# Patient Record
Sex: FEMALE | Race: WHITE | NOT HISPANIC OR LATINO | URBAN - METROPOLITAN AREA
[De-identification: names, ages, dates, MRNs, and addresses within clinical notes are randomized per-mention and may not be internally consistent; named-entity substitution may affect disease eponyms.]

---

## 2018-12-17 PROBLEM — R19.7 DIARRHEA: Status: ACTIVE | Noted: 2018-12-17

## 2018-12-17 PROBLEM — R53.83 FATIGUE: Status: ACTIVE | Noted: 2018-12-17

## 2018-12-17 PROBLEM — D64.9 ANEMIA: Status: ACTIVE | Noted: 2018-12-17

## 2019-11-25 ENCOUNTER — OFFICE VISIT (OUTPATIENT)
Dept: URGENT CARE | Facility: CLINIC | Age: 34
End: 2019-11-25
Payer: COMMERCIAL

## 2019-11-25 VITALS
WEIGHT: 184 LBS | SYSTOLIC BLOOD PRESSURE: 118 MMHG | BODY MASS INDEX: 30.15 KG/M2 | DIASTOLIC BLOOD PRESSURE: 62 MMHG | RESPIRATION RATE: 14 BRPM

## 2019-11-25 DIAGNOSIS — J00 NASOPHARYNGITIS: Primary | ICD-10-CM

## 2019-11-25 PROCEDURE — 99213 OFFICE O/P EST LOW 20 MIN: CPT | Performed by: PHYSICIAN ASSISTANT

## 2019-11-25 NOTE — PROGRESS NOTES
[facility]        NAME: Radha Quispe is a 30 y o  female  : 1985    MRN: [id number]  DATE: 2019  TIME: 1:59 PM    Assessment and Plan   Nasopharyngitis [J00]  1  Nasopharyngitis       Patient Instructions   Continue Advil cold and Sinus  Continue Allegra and Flonase as previously instructed  Saline nasal spray or Neti pot will help to flush her nasal passages  Saltwater gargles, warm tea with honey, and throat lozenges may be relieving of throat discomfort  Use a cool mist humidifier at bedtime, turning on hours prior to bed with your bedroom doors shut for maximum relief  Follow up with your family doctor in 3-5 days  Proceed to the ER if symptoms worsen  Chief Complaint     Chief Complaint   Patient presents with    Cold Like Symptoms     PND, sore throat, stuffy nose; started yesterday     History of Present Illness     29 y/o female with c/o sore throat x 1 day  Reports sudden onset of symptoms late yesterday with evening  Notes NC, mucus in her throat, and a constant soreness  Had body aches yesterday, now resolved  Notes she is feeling better but wanted to be checked just in case  Treated yesterday with Advil sinus and congestion with some relief yesterday  No sick contacts or recent travel  Nonsmoker  No flu shot  Reports hx of seasonal allergies during spring, and notes she does get sore throat with this  Not currently using her Allegra or Flonase  No difficulty swallowing  Review of Systems   Review of Systems   Constitutional: Negative for chills, diaphoresis, fatigue and fever  HENT: Positive for congestion, postnasal drip and sore throat  Negative for ear pain and rhinorrhea  Respiratory: Negative for cough and wheezing  Gastrointestinal: Negative for abdominal pain, diarrhea, nausea and vomiting  Musculoskeletal: Positive for myalgias  Skin: Negative for rash  Neurological: Negative for dizziness and headaches       Current Medications     No current outpatient medications on file  Current Allergies     Allergies as of 2019    (No Known Allergies)            The following portions of the patient's history were reviewed and updated as appropriate: allergies, current medications, past family history, past medical history, past social history, past surgical history and problem list      Past Medical History:   Diagnosis Date    Patient denies medical problems        Past Surgical History:   Procedure Laterality Date     SECTION       SECTION         Family History   Problem Relation Age of Onset    No Known Problems Mother     Hypertension Father          Medications have been verified  Objective   /62   Resp 14   Wt 83 5 kg (184 lb)   LMP 2019   BMI 30 15 kg/m²     Physical Exam     Physical Exam   Constitutional: Vital signs are normal  She appears well-developed and well-nourished  She is cooperative  She does not appear ill  No distress  HENT:   Head: Normocephalic and atraumatic  Right Ear: Hearing, tympanic membrane, external ear and ear canal normal    Left Ear: Hearing, tympanic membrane, external ear and ear canal normal    Nose: Mucosal edema and rhinorrhea present  Mouth/Throat: Uvula is midline and mucous membranes are normal  Mucous membranes are not pale, not dry and not cyanotic  No oral lesions  No uvula swelling  Posterior oropharyngeal erythema (mild) present  No oropharyngeal exudate, posterior oropharyngeal edema or tonsillar abscesses  Tonsils are 1+ on the right  Tonsils are 1+ on the left  No tonsillar exudate  Eyes: Conjunctivae and lids are normal  Right eye exhibits no discharge and no exudate  Left eye exhibits no discharge and no exudate  Neck: Trachea normal and phonation normal  Neck supple  No tracheal tenderness present  No neck rigidity  No edema and no erythema present  Cardiovascular: Normal rate, regular rhythm and normal heart sounds   Exam reveals no distant heart sounds  Pulmonary/Chest: Effort normal and breath sounds normal  No stridor  No respiratory distress  She has no decreased breath sounds  She has no wheezes  She has no rhonchi  She has no rales  Abdominal: Soft  Bowel sounds are normal  She exhibits no distension and no mass  There is no tenderness  There is no rigidity, no rebound and no guarding  Lymphadenopathy:     She has no cervical adenopathy  Neurological: She is alert  She is not disoriented  No cranial nerve deficit  Coordination and gait normal    Skin: Skin is warm, dry and intact  No rash noted  She is not diaphoretic  No erythema  No pallor  Psychiatric: She has a normal mood and affect  Her behavior is normal  Judgment and thought content normal    Nursing note and vitals reviewed

## 2019-11-25 NOTE — PATIENT INSTRUCTIONS
Continue Advil cold and Sinus  Continue Allegra and Flonase as previously instructed  Saline nasal spray or Neti pot will help to flush her nasal passages  Saltwater gargles, warm tea with honey, and throat lozenges may be relieving of throat discomfort  Use a cool mist humidifier at bedtime, turning on hours prior to bed with your bedroom doors shut for maximum relief  Follow up with your family doctor in 3-5 days  Proceed to the ER if symptoms worsen